# Patient Record
(demographics unavailable — no encounter records)

---

## 2024-10-14 NOTE — PHYSICAL EXAM
[de-identified] : Foot/ankle (bilateral)   Inspection  Skin: normal  Swelling: Mild over the deltoid ligaments bilaterally Arch: Moderate pes planus Tendon defect: none   Palpation  Tenderness: Mild Location: Over the deltoid ligaments bilaterally  Ankle ROM  Dorsiflexion: normal  Plantarflexion: normal  Eversion: normal  Inversion: normal  Toe flexion: normal  Toe extension: normal  Foot Motor Strength  Ankle plantarflexion: 5/5  Dorsiflexion: 5/5  Inversion: 4/5  Eversion: 5/5  First digit flexion: 1-2/5 bilaterally  Sensory index  Normal Distal pulses Normal   Stress test  Negative: ankle anterior drawer, ankle lateral tilt, subtalar inversion, subtalar eversion   Special Tests  Bernal test: normal  Kleigers test: normal  Tinnels: normal

## 2024-10-14 NOTE — DISCUSSION/SUMMARY
[de-identified] : CAMRON DUNHAM is a 70 year old female with PMH of rheumatoid arthritis and crystal arthropathy presents today for acute on chronic bilateral medial sided ankle and foot swelling consistent with pes planus causing increased stress on the bilateral feet and ankles and likely chronic deltoid ligament sprains.  Discussed paresthesias to the bilateral great toes are likely due to some compressive mild neuropathy of the interdigital nerves.  Patient states overall her ankle pain and swelling has improved however her main concern at this time is inability to flex the great toe bilaterally.  Denies any trauma or other issues that she can recall.  Plan: 1.  Bilateral lower extremity EMGs ordered to rule out any pathology 2.  Also discussed referral to formal PT can be helpful and can adjust accordingly based on EMG results 3.  Continue Superfeet inserts as needed 4.  Continue home exercise program 5.  Follow-up in 3 weeks after EMG

## 2024-10-14 NOTE — HISTORY OF PRESENT ILLNESS
[de-identified] : CAMRON DUNHAM is a 70 year old female with PMH of rheumatoid arthritis and crystal arthropathy presents today for acute on chronic bilateral medial sided ankle and foot swelling.  She says this has been going on more significant recently over the last 3 weeks and has been a cause for concern for her.  The swelling and discomfort is mostly on the medial aspect of both ankles equally.  She also is feeling some paresthesias and tingling into the bilateral first toes.  Here today for further evaluation.  Interval history: Overall improved in terms of ankle pain and swelling however with inability to flex the bilateral first digits in her toes.  States this has been going on for months and is now her main concern.  Denies trauma or other inciting event.

## 2024-10-17 NOTE — PHYSICAL EXAM
[Chaperone Present] : A chaperone was present in the examining room during all aspects of the physical examination [[id number]] : A chaperone was present during the pelvic exam. [FreeTextEntry2] : clarice self [Appropriately responsive] : appropriately responsive [Alert] : alert [No Acute Distress] : no acute distress [No Lymphadenopathy] : no lymphadenopathy [Regular Rate Rhythm] : regular rate rhythm [No Murmurs] : no murmurs [Clear to Auscultation B/L] : clear to auscultation bilaterally [Soft] : soft [Non-tender] : non-tender [Non-distended] : non-distended [No HSM] : No HSM [No Lesions] : no lesions [No Mass] : no mass [Oriented x3] : oriented x3 [Examination Of The Breasts] : a normal appearance [No Masses] : no breast masses were palpable [Single ___ cm] : a single [unfilled] ~Ucm [Location: ___] : [unfilled] [Labia Majora] : normal [Labia Minora] : normal [Normal] : normal [Uterine Adnexae] : normal

## 2024-10-17 NOTE — PHYSICAL EXAM
[FreeTextEntry3] :  Gen:                        Well appearing, well nourished, NAD. Skin:                       Eccrine:                 Within normal limits   Face:                      Neck:                            Genital:                                  Neuro:                     No focal deficits. Age appropriate. Psych:                     Appropriate affect.

## 2024-10-17 NOTE — ASSESSMENT
[FreeTextEntry1] : Pt with RA on HCQ, enbrel  #rosacea -gentle care -start azelaic acid 1-2x/d as alistair -sun protection stressed  #SKs -DPNs on face, pt concerned about getting more -benign, reassurance, cosmetic elective referral declined  #Lichensclerosis  -clinically consistent findings on labia, biopsy done today by GYN -avoid pick scratching rubbing area (pt currently doing all of above) -agree would start halobetasol cr and f/up path results

## 2024-10-17 NOTE — PROCEDURE
[Cervical Pap Smear] : cervical Pap smear [Liquid Base] : liquid base [Tolerated Well] : the patient tolerated the procedure well [No Complications] : there were no complications [Vulvar Biopsy] : Vulvar Biopsy [] : in the Roxane-Clitoral region

## 2024-10-17 NOTE — HISTORY OF PRESENT ILLNESS
[FreeTextEntry1] : rash, lichen sclerosus [de-identified] : Pt referred by PCP Dr. Hoskins for rash on face also here for eval of genital LS biopsy just done by GYN today, was rx'd halobetasol cream

## 2024-10-22 NOTE — ASSESSMENT
[FreeTextEntry1] : Patient with +CCP RA on HCQ tx hand arthropathy :  As patient staves off of biologic tx, would titrate HCQ to twice daily with short interval of NSAID use.   Patient will benefit from physical therapy to help with joint mobility and muscle strengthening. Quadriceps strengthening exercises demonstrated in the office. Weight loss has been encouraged to reduce load over the medial joint line. Viscosupplementation has been encouraged to provide additional lubrication and joint support. Food plan discussed in detail.  Avoidance of fried foods rec in the setting of hepatic steatosis.  Avoidance of high purine foods and etoh to help with artralgias including crystal arthropathy , commonly seen in post menopausal state.   She will continue on Calcium and VitD at the recommended doses of 1200mg and 800IU daily, respectively, whether through foods or supplements.  We reviewed calcium and VitD enriched foods as well.   She is in agreement with the above plan and will return in three months' time.

## 2024-10-22 NOTE — REVIEW OF SYSTEMS
[Fever] : no fever [Chills] : no chills [Eye Pain] : no eye pain [Sore Throat] : no sore throat [Hoarseness] : no hoarseness [Joint Swelling] : joint swelling [Joint Stiffness] : joint stiffness [Difficulty Walking] : no difficulty walking [Easy Bleeding] : no tendency for easy bleeding [Easy Bruising] : no tendency for easy bruising

## 2024-10-22 NOTE — HISTORY OF PRESENT ILLNESS
[FreeTextEntry1] : Patient returns with resurfacing of hand arthropathy and RT foot arthropathy after re introduction of red wine and fried foods.  She reports intermittent stiffness of the hands with recent discomfort of the 2nd IP joints over few days period accompanied with swelling.   She has resumed Etanercept and c/w HCQ tx.  Prior hand films reflect degenerative changes without erosive sequelae. Blood testing reflecting positive CCP rheumatoid arthritis ; Hep C viral loads are undetected. She c/w caring full time for mother in [place].  She otherwise denies visual disturbances, oral ulcers, shortness of breath, chest pain, motor/sensory disturbances, Raynauds or fever.

## 2024-10-22 NOTE — CONSULT LETTER
[Dear  ___] : Dear  [unfilled], [Courtesy Letter:] : I had the pleasure of seeing your patient, [unfilled], in my office today. [Please see my note below.] : Please see my note below. [Consult Closing:] : Thank you very much for allowing me to participate in the care of this patient.  If you have any questions, please do not hesitate to contact me. [Sincerely,] : Sincerely, [FreeTextEntry2] : Nadia Hernandez MD [FreeTextEntry3] : Breann Shankar M.D., RhMSUS\par   of Medicine \par  [facility]\par  \par

## 2024-10-22 NOTE — PHYSICAL EXAM
[General Appearance - Alert] : alert [General Appearance - In No Acute Distress] : in no acute distress [Sclera] : the sclera and conjunctiva were normal [Auscultation Breath Sounds / Voice Sounds] : lungs were clear to auscultation bilaterally [Heart Sounds] : normal S1 and S2 [Edema] : there was no peripheral edema [No Spinal Tenderness] : no spinal tenderness [] : no rash [Skin Lesions] : no skin lesions [Motor Exam] : the motor exam was normal [Oriented To Time, Place, And Person] : oriented to person, place, and time [Impaired Insight] : insight and judgment were intact [FreeTextEntry1] : Mild synovitis in the IPs b/l ; maintained tibiotalar motions

## 2024-10-31 NOTE — HISTORY OF PRESENT ILLNESS
[FreeTextEntry1] : 70 year old woman with numbness in big toes, decreased ROM, ankle swelling, chronic lower back pain. No previous EMG testing. Plans to start PT.

## 2024-10-31 NOTE — PHYSICAL EXAM
[Normal] : Alert and in no acute distress [de-identified] : pes planus, DF 5/5 [de-identified] : sensation intact

## 2024-10-31 NOTE — ASSESSMENT
[FreeTextEntry1] : 70 year old woman h/o RA with decreased ROM b/l great toes, numbness in feet EMG/NCV with no evidence for acute radiculopathy or sensory neuropathy

## 2024-11-04 NOTE — PHYSICAL EXAM
[de-identified] : Foot/ankle (bilateral)   Inspection  Skin: normal  Swelling: None Arch: Moderate pes planus Tendon defect: none   Palpation  Tenderness: None Location: Over the deltoid ligaments bilaterally  Ankle ROM  Dorsiflexion: normal  Plantarflexion: normal  Eversion: normal  Inversion: normal  Toe flexion: normal  Toe extension: normal  Foot Motor Strength  Ankle plantarflexion: 5/5  Dorsiflexion: 5/5  Inversion: 4/5  Eversion: 5/5  First digit flexion: 2/5 bilaterally  Sensory index  Normal Distal pulses Normal   Stress test  Negative: ankle anterior drawer, ankle lateral tilt, subtalar inversion, subtalar eversion   Special Tests  Bernal test: normal  Kleigers test: normal  Tinnels: normal

## 2024-11-04 NOTE — HISTORY OF PRESENT ILLNESS
[de-identified] : CAMRON DUNHAM is a 70 year old female with PMH of rheumatoid arthritis and crystal arthropathy presents today for acute on chronic bilateral medial sided ankle and foot swelling.  She says this has been going on more significant recently over the last 3 weeks and has been a cause for concern for her.  The swelling and discomfort is mostly on the medial aspect of both ankles equally.  She also is feeling some paresthesias and tingling into the bilateral first toes.  Here today for further evaluation.  Interval history: Pain and swelling have not returned.  Has been doing home exercises for the big toe.  Had EMG done here to discuss results.  Will start physical therapy next week.

## 2024-11-04 NOTE — DISCUSSION/SUMMARY
[de-identified] : CAMRON DUNHAM is a 70 year old female with PMH of rheumatoid arthritis and crystal arthropathy presents today for acute on chronic bilateral medial sided ankle and foot swelling consistent with pes planus causing increased stress on the bilateral feet and ankles and likely chronic deltoid ligament sprains.  Discussed paresthesias to the bilateral great toes are likely due to some compressive mild neuropathy of the interdigital nerves.  Discussed results of EMG which did not show any signs of radiculopathy sensory changes or focal pathologies.  Discussed toe range of motion and strength should improve with physical therapy.  Plan: 1.  Patient will start physical therapy next week 2.  Continue home exercise program 3.  Continue Superfeet inserts as needed 4.  Patient will follow-up in 3 months as needed

## 2024-12-09 NOTE — OB HISTORY
[Total Preg ___] : : [unfilled] [Full Term ___] : [unfilled] (full-term) [Abortions ___] : [unfilled] (abortions) [Living ___] : [unfilled] (living) [Menarche Age ____] : age at menarche was [unfilled] [Menopause  Age ____] : menopause occurred at age [unfilled]

## 2024-12-11 NOTE — ASSESSMENT
[FreeTextEntry1] : dyspepsia  discussed Rule of 2's; pt should avoid eating too much; too fast; too spicy; too lousy; less than two hours before bed  -Things to avoid including overeating, spicy foods, tight clothing, eating within three hours of bed, this list is not all inclusive.  -For treatment of reflux, possible options discussed including diet control, H2 blockers, PPIs, as well as coating motility agents discussed as treatment options. Timing of meals and proximity of last meal to sleep were discussed. If symptoms persist, a formal gastrointestinal evaluation is needed.  we discussed elevating bed to 45 degree angle   avoid spicy foods nsaids   dont eat and lie down wait 2hrs   2 kyra ring    no dysphagia   treated in 2022  if reflux worsens and continues   will do egd  .   3 fatty liver  Reviewed the spectrum of disease, the risk of disease progression to developing cirrhosis and the associated complications. Explained the patient may develop liver cancer without cirrhosis and therefore should be under the yearly surveillance with an abdominal ultrasound. Taught back that the best treatment of fatty liver disease is diet and exercise. Discussed the present diet with the patient and recommended the avoidance of fatty foods and to follow a heart healthy diet. Also explained that weight loss may lead to an improvement in the overall underlying liver disease. Taught back the physiology of alcohol abstinence has a important contribution to liver health.  4. arthritis  Patient will benefit from physical therapy to help with joint mobility and muscle strengthening. Quadriceps strengthening exercises encouraged. Weight loss has been encouraged to reduce load over the joint line.  Discussed comprehensive approach involving diet and nutrition, regular physical activity, and behavior change , with an emphasis on long-term weight management rather than short term extreme weight reduction. Discussions on relaxation, stress-reducing techniques and importance of good sleep hygiene reviewed.

## 2024-12-11 NOTE — HISTORY OF PRESENT ILLNESS
[FreeTextEntry1] : rosacea, mild PFB, LS [de-identified] : rosacea and mild PFB on face vulvar lichen sclerosus and vulvar dysplasia on halobetasol, seeing Dr. Juarez for vulvectomy

## 2024-12-11 NOTE — PHYSICAL EXAM
[Alert] : alert [No Acute Distress] : no acute distress [Sclera] : the sclera and conjunctiva were normal [Normal Lips/Gums] : the lips and gums were normal [Oropharynx] : the oropharynx was normal [Normal Appearance] : the appearance of the neck was normal [Heart Rate And Rhythm] : heart rate was normal and rhythm regular [Normal S1, S2] : normal S1 and S2 [Murmurs] : no murmurs [None] : no edema [Soft, Nontender] : the abdomen was soft and nontender [No Mass] : no masses were palpated [No HSM] : no hepatosplenomegaly noted [Cervical Lymph Nodes Enlarged Posterior Bilaterally] : no posterior cervical lymphadenopathy [Cervical Lymph Nodes Enlarged Anterior Bilaterally] : no anterior cervical lymphadenopathy [No CVA Tenderness] : no CVA  tenderness [Abnormal Walk] : normal gait [No Clubbing, Cyanosis] : no clubbing or cyanosis of the fingernails [Normal Color / Pigmentation] : normal skin color and pigmentation [] : no rash [Normal Turgor] : normal skin turgor [Motor Exam] : the motor exam was normal [Normal] : oriented to person, place, and time

## 2024-12-11 NOTE — HISTORY OF PRESENT ILLNESS
[de-identified] : 0/14/22 Schatzki ring broken up and chronic inactive gastritis.   [FreeTextEntry1] : : 10/14/22 hyperplastic changes in sigmoid. [de-identified] : XAM: [id number] - US ABDOMEN COMPLETE  - ORDERED BY: WING DOLL   PROCEDURE DATE:  09/09/2024    INTERPRETATION:  CLINICAL INFORMATION: 70-year-old female with fatty liver, hepatitis C  COMPARISON: Abdominal pelvic CT 12/15/2017  TECHNIQUE: Sonography of the abdomen.  FINDINGS: Liver: The liver parenchyma demonstrates mildly increased echogenicity suggesting possible mild hepatic steatosis. The liver surface contour is smooth. No focal intrahepatic abnormality is identified. Bile ducts: Normal caliber. Common bile duct measures 3 mm. Gallbladder: Within normal limits. No gallstones, wall thickening, or pericholecystic fluid. Pancreas: Visualized portions are within normal limits. Spleen: 8.2 cm. Within normal limits. Right kidney: 10.8 cm. No hydronephrosis. Left kidney: 10.9 cm. No hydronephrosis. Ascites: None. Aorta and IVC: Visualized portions are within normal limits.  IMPRESSION:  1. The liver parenchyma demonstrates mildly increased echogenicity suggesting possible mild hepatic steatosis. The liver is otherwise unremarkable. No evidence for cirrhosis or focal liver lesion. 2. Otherwise unremarkable abdominal ultrasound.

## 2024-12-11 NOTE — ASSESSMENT
[FreeTextEntry1] : Pt with RA on HCQ, enbrel rosacea and mild PFB on face azelaic denied by insurance vulvar lichen sclerosus and vulvar dysplasia on halobetasol, seeing Dr. Juarez for vulvectomy  #rosacea, mild PFB -stop plucking, switch to trimming or LHR -gentle care, sun protection -azelaic not covered - rec START OTC 10% azelaic from TO. if failure then OTC differin - both discussed. Proper use and SE meds disc.  -START HC lotion daily up to 1 week, then taper to TIW prn maint  #Lichensclerosis  -clinically consistent findings on labia, biopsy done by GYN showed dysplasia in background lichenoid inflammation -avoid pick scratching rubbing area -halobetasol, seeing Dr. Juarez for vulvectomy

## 2024-12-18 NOTE — RESULTS/DATA
[] : results reviewed [ECG Reviewed] : reviewed [Normal] : The 12 - lead ECG is normal [NSR] : normal sinus rhythm [de-identified] : elevated PTT

## 2024-12-18 NOTE — RESULTS/DATA
[] : results reviewed [ECG Reviewed] : reviewed [Normal] : The 12 - lead ECG is normal [NSR] : normal sinus rhythm [de-identified] : elevated PTT

## 2024-12-20 NOTE — PHYSICAL EXAM
[Chaperone Present] : A chaperone was present in the examining room during all aspects of the physical examination [74370] : A chaperone was present during the pelvic exam. [Normal] : Recto-Vaginal Exam: Normal [Fully active, able to carry on all pre-disease performance without restriction] : Status 0 - Fully active, able to carry on all pre-disease performance without restriction [Abnormal] : External genitalia: Abnormal [de-identified] : bilateral  anterior vulva involved with dvin [de-identified] : smooth rectovag septum, no cul de sac nodules.
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
## 2024-12-20 NOTE — PHYSICAL EXAM
[Chaperone Present] : A chaperone was present in the examining room during all aspects of the physical examination [[id number]] : A chaperone was present during the pelvic exam. [Normal] : Recto-Vaginal Exam: Normal [Fully active, able to carry on all pre-disease performance without restriction] : Status 0 - Fully active, able to carry on all pre-disease performance without restriction [Abnormal] : External genitalia: Abnormal [de-identified] : bilateral  anterior vulva involved with dvin [de-identified] : smooth rectovag septum, no cul de sac nodules.

## 2024-12-20 NOTE — PLAN
[FreeTextEntry1] :  During today's visit, I spent 30 minutes reviewing the patient's medical chart, addressing the patient's concerns, and discussing their treatment plan in detail. We reviewed the results of recent tests and discussed the next steps in their care, including medication adjustments and follow-up appointments. Additionally, I took the time to answer the patient's questions and provide education on managing their condition at home.  This extended consultation allowed for a comprehensive assessment and personalized approach to the patient, reflecting the complexity and time required for today's visit.

## 2024-12-20 NOTE — HISTORY OF PRESENT ILLNESS
[FreeTextEntry1] : GYN/Ref:  Dr. Bernal PCP:  Dr. Alexa Perez GI:  Dr. Stacie Hoskins  Ms. Turpin, 70 years old, referred for dVIN s/p roxane-clitoral / vulvar biopsy.  Pt has history of lichen sclerosis; left breast cancer in 2017 with reconstruction and chemotherapy; hepatitis C, under control, rheumatoid arthritis.    Pt states no routine GYN care for a few years.  However, she was prescribed a "cream" for vaginal itching shortly after completing chemotherapy treatment for breast cancer in 2017.  Most recently, symptoms have progressed to burning when urinating and increased vulvar redness.  She saw Dr. Bernal who then did a biopsy identifying vulvar dysplasia.    Denies f/c/n/v/d/vaginal bleeding, Denies abdominal pain, pressure, or bloating.  Denies any other associated symptoms.  She is going to physical therapy for bilateral foot pain (workup included gout assessment which she says she does not have).  She also has low back pain which radiates to her right upper buttock and occasionally right flank.    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 See note: Note:  Immunohistochemical stains results; P16 is negative, P53 is wild-type, and Ki67 confined to basal cell layers  POB:   (2 abortions; 1 vaginal delivery - child is now 35 years old) PGYN:  Menarche age 13,  LMP age 45 PMH:  hepatitis C, Rheumatoid arthritis, HTN Meds:  hydroxychloroquine; ramipril Allergies: NKDA PSH:  Left breast mastectomy with Kristen flap (2017 - Dr. Joseluis Thompson;  Dr. Rushing); right hip replacement  Social Hx:   Lives alone, ex smoker (quit  - smoked for 40 years); social alcohol; no drugs FH:  No family history of cancer  Health Maintenance: Mammogram: 24:  BIRADS 2 ([facility]) Colonoscopy:   with Dr. Hoskins - next due  DEXA:  PAP: :  Negative for intraepithelial lesion or malignancy.  Negative HPV ([facility])

## 2024-12-20 NOTE — HISTORY OF PRESENT ILLNESS
[No Pertinent Cardiac History] : no history of aortic stenosis, atrial fibrillation, coronary artery disease, recent myocardial infarction, or implantable device/pacemaker [No Pertinent Pulmonary History] : no history of asthma, COPD, sleep apnea, or smoking [No Adverse Anesthesia Reaction] : no adverse anesthesia reaction in self or family member [(Patient denies any chest pain, claudication, dyspnea on exertion, orthopnea, palpitations or syncope)] : Patient denies any chest pain, claudication, dyspnea on exertion, orthopnea, palpitations or syncope [FreeTextEntry1] : vulvectomy [FreeTextEntry4] : 70yoF PMH RA, left breast CA s/p left mastectomy with muscle flap reconstruction s/p adjuvant chemotherapy (2017), OA s/p right R THR, Schatzki ring, vulvar dysplasia presents for preop assessment  enbrel held x 1 month UTD ophth exam  [de-identified] : 69yoF PMH RA, left breast CA s/p left mastectomy with muscle flap reconstruction s/p adjuvant chemotherapy (2017), OA s/p right R THR, Schatzki ring presents to establish care  follows closely with rheum, stable ophth exam q 6 mos - normal exam  follows with outside oncologist Dr Erwin, next appt 1/2024  endorses perioral papular rash wears mask constantly dermatology - perioral dermatitis  endorses subcutaneous nodule palmar aspect hand, nontender

## 2024-12-20 NOTE — DISCUSSION/SUMMARY
[Reviewed Clinical Lab Test(s)] : Results of clinical tests were reviewed. 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 Patient understands that during resection, portion of clitoris may need resection and this may affect her sexual funciton. patient would like to proceed with surgery discussed plan for simple vulvectomy b/l vulvectomy,  discussed risks including bleeding, infection, injury to surrounding structures, vessels, nerves, lymphedema, wound separation  plan for med clearance h/o hep C PST all quesitons answered

## 2024-12-20 NOTE — ASSESSMENT
[High Risk Surgery - Intraperitoneal, Intrathoracic or Supringuinal Vascular Procedures] : High Risk Surgery - Intraperitoneal, Intrathoracic or Supringuinal Vascular Procedures - No (0) [Ischemic Heart Disease] : Ischemic Heart Disease - No (0) [Congestive Heart Failure] : Congestive Heart Failure - No (0) [Prior Cerebrovascular Accident or TIA] : Prior Cerebrovascular Accident or TIA - No (0) [Creatinine >= 2mg/dL (1 Point)] : Creatinine >= 2mg/dL - No (0) [Insulin-dependent Diabetic (1 Point)] : Insulin-dependent Diabetic - No (0) [0] : 0 , RCRI Class: I, Risk of Post-Op Cardiac Complications: 3.9%, 95% CI for Risk Estimate: 2.8% - 5.4% [Patient Requires Further Testing] : Patient requires further testing [Modify medications prior to procedure] : Modify medications prior to procedure [As per surgery] : as per surgery [Patient Optimized for Surgery] : Patient optimized for surgery [No Further Testing Recommended] : no further testing recommended [FreeTextEntry7] : enbrel held

## 2024-12-20 NOTE — PHYSICAL EXAM
[Chaperone Present] : A chaperone was present in the examining room during all aspects of the physical examination [23083] : A chaperone was present during the pelvic exam. [Normal] : Recto-Vaginal Exam: Normal [Fully active, able to carry on all pre-disease performance without restriction] : Status 0 - Fully active, able to carry on all pre-disease performance without restriction [Abnormal] : External genitalia: Abnormal [de-identified] : bilateral  anterior vulva involved with dvin [de-identified] : smooth rectovag septum, no cul de sac nodules.

## 2024-12-20 NOTE — HISTORY OF PRESENT ILLNESS
[No Pertinent Cardiac History] : no history of aortic stenosis, atrial fibrillation, coronary artery disease, recent myocardial infarction, or implantable device/pacemaker [No Pertinent Pulmonary History] : no history of asthma, COPD, sleep apnea, or smoking [No Adverse Anesthesia Reaction] : no adverse anesthesia reaction in self or family member [(Patient denies any chest pain, claudication, dyspnea on exertion, orthopnea, palpitations or syncope)] : Patient denies any chest pain, claudication, dyspnea on exertion, orthopnea, palpitations or syncope [FreeTextEntry1] : vulvectomy [FreeTextEntry4] : 70yoF PMH RA, left breast CA s/p left mastectomy with muscle flap reconstruction s/p adjuvant chemotherapy (2017), OA s/p right R THR, Schatzki ring, vulvar dysplasia presents for preop assessment  enbrel held x 1 month UTD ophth exam  [de-identified] : 69yoF PMH RA, left breast CA s/p left mastectomy with muscle flap reconstruction s/p adjuvant chemotherapy (2017), OA s/p right R THR, Schatzki ring presents to establish care  follows closely with rheum, stable ophth exam q 6 mos - normal exam  follows with outside oncologist Dr Erwin, next appt 1/2024  endorses perioral papular rash wears mask constantly dermatology - perioral dermatitis  endorses subcutaneous nodule palmar aspect hand, nontender

## 2025-01-16 NOTE — LETTER BODY
[Dear  ___] : Dear  [unfilled], [I recently saw our patient [unfilled] for a follow-up visit.] : I recently saw our patient, [unfilled] for a follow-up visit. [Attached please find my note.] : Attached please find my note. [FreeTextEntry2] :   Pt is s/p Partial simple bilateral vulvectomy and vulvar biopsies on 1/3/25   [FreeTextEntry1] : final path [FreeTextEntry1] :  - Continue with all current treatments. \par  - Labs done in office\par - Start on statin therapy in view of elevated 10 year ASCVD risk score and patient is in agreement with this.

## 2025-01-16 NOTE — REASON FOR VISIT
[Post Op] : post op visit [de-identified] : 1/3/2025 [de-identified] : Partial simple bilateral vulvectomy and vulvar biopsies.   [de-identified] : Denies f/c/n/v/d/vaginal bleeding.  Overall feels well.  Meeting milestones of recovery.  Regular BM and voiding freely.  Final pathology: Pending

## 2025-01-16 NOTE — DISCUSSION/SUMMARY
[Clean] : was clean [Dry] : was dry [Intact] : was intact [Erythema] : was not erythematous [Ecchymosis] : was not ecchymotic [None] : had no drainage

## 2025-01-21 NOTE — ASSESSMENT
[FreeTextEntry1] : Patient with +CCP RA on HCQ tx hand and LT knee arthropathy :  As patient staves off of biologic tx, would c/w titrated dose of HCQ twice daily. She understands the increased risk of cardiovascular events related to RA and therefore the importance of dietary and lifestyle modifications. Patient will benefit from physical therapy to help with joint mobility and muscle strengthening. Quadriceps strengthening exercises demonstrated in the office. US guided LT knee injection given for pain relief.  Weight loss has been encouraged to reduce load over the medial joint line. Viscosupplementation has been encouraged to provide additional lubrication and joint support.  Food plan discussed in detail.  Avoidance of fried foods rec in the setting of hepatic steatosis.  Avoidance of high purine foods and etoh to help with artralgias including crystal arthropathy , commonly seen in post menopausal state.   She will continue on Calcium and VitD at the recommended doses of 1200mg and 800IU daily, respectively, whether through foods or supplements.  We reviewed calcium and VitD enriched foods as well.  She is in agreement with the above plan and will return in three months' time.

## 2025-01-21 NOTE — PHYSICAL EXAM
[General Appearance - Alert] : alert [General Appearance - In No Acute Distress] : in no acute distress [Sclera] : the sclera and conjunctiva were normal [Auscultation Breath Sounds / Voice Sounds] : lungs were clear to auscultation bilaterally [Heart Sounds] : normal S1 and S2 [Edema] : there was no peripheral edema [No Spinal Tenderness] : no spinal tenderness [] : no rash [Skin Lesions] : no skin lesions [Motor Exam] : the motor exam was normal [Oriented To Time, Place, And Person] : oriented to person, place, and time [Impaired Insight] : insight and judgment were intact [FreeTextEntry1] : Mild synovitis in the IPs b/l ; tender over the ljoint lines of the LT knee ;maintained tibiotalar motions

## 2025-01-21 NOTE — PROCEDURE
[Today's Date:] : Date: [unfilled] [Patient] : the patient [Risks] : risks [Benefits] : benefits [Consent Obtained] : written consent was obtained prior to the procedure and is detailed in the patient's record [Therapeutic] : therapeutic [#1 Site: ______] : #1 site identified in the [unfilled] [Betadine] : betadine solution [Alcohol] : alcohol [___ml 1% Lidocaine] : [unfilled] ml of 1% lidocaine [Depomedrol ___ mg] : Depomedrol [unfilled] mg [Tolerated Well] : the patient tolerated the procedure well [No Complications] : there were no complications [Patient Instructed to Call] : patient was instructed to call if redness at site, a decrease in range of motion or an increase in pain is noted after procedure. [de-identified] : 22g x  2 in inserted  [FreeTextEntry1] : Patient Name: Carlotta Turpin : 1954 Study Date: 2025 Study Type: Guidance of needle placement  Indication: Pain in LT knee joint ; obesity Study Site: LT knee Equipment: eyetok e 4-12MHz linear transducer    Findings: The use of a Logiq e 12 MHz linear transducer with live ultrasound guidance of the knee was necessary given the patient's BMI and local body habitus overlying and obscuring the accurate identification of normal body bony anatomy used to identify the injection site and the depth of soft tissue space;the location of the needle tip and intra-articular delivery of the medication while avoiding neurovascular structures confirmed. Orthogonal views of the suprapatellar synovial pouch was taken with US .  After prepping the lateral knee with betadine, a 22 x2.0guage needle was advanced to the synovial cavity under direct US visualization using in-plane technique.  40mg of methylprednisolone and 1% lidocaine was injected.  Impressions: Successful injection of the LT knee using US guidance.

## 2025-01-21 NOTE — HISTORY OF PRESENT ILLNESS
[FreeTextEntry1] : Patient returns with mild resurfacing of hand arthropathy and increased stiffness of the LT knee after weight gain of 10lbs since last summer.   She reports intermittent stiffness of the hands with discomfort of the RT 2nd and 3rd IP joints. She has resumed Etanercept and c/w HCQ tx.  Prior hand films reflect degenerative changes without erosive sequelae. Blood testing reflecting positive CCP rheumatoid arthritis ; Hep C viral loads are undetected. She c/w caring full time for mother in [place]. Patient awaits results of recent vulvar biopsies. She otherwise denies visual disturbances, oral ulcers, shortness of breath, chest pain, motor/sensory disturbances, Raynauds or fever.

## 2025-01-21 NOTE — CONSULT LETTER
[Dear  ___] : Dear  [unfilled], [Courtesy Letter:] : I had the pleasure of seeing your patient, [unfilled], in my office today. [Please see my note below.] : Please see my note below. [Consult Closing:] : Thank you very much for allowing me to participate in the care of this patient.  If you have any questions, please do not hesitate to contact me. [Sincerely,] : Sincerely, [FreeTextEntry2] : Alexa Perez MD  [FreeTextEntry3] : Breann Shankar M.D., RhMSUS\par   of Medicine \par  [facility] [employer] at [facility]\par  \par

## 2025-02-08 NOTE — DISCUSSION/SUMMARY
[Clean] : was clean [Dry] : was dry [Intact] : was intact [None] : had no drainage [Doing Well] : is doing well [de-identified] : well healing vulvectomy incision site.  No s/s of infection or bleeding.   [Erythema] : was not erythematous [Ecchymosis] : was not ecchymotic [FreeTextEntry1] : 1 cm area left vulva - opened.  No s/s of infection.

## 2025-02-08 NOTE — REASON FOR VISIT
[Post Op] : post op visit [de-identified] : 1/3/2025 [de-identified] : Partial simple bilateral vulvectomy and vulvar biopsies.   [de-identified] : Denies f/c/n/v/d/vaginal bleeding.  Pt still had itching and called the office stating Vaseline had helped.  Advised to switch to Aquaphor and return to the office.    Final pathology: Still Pending

## 2025-02-08 NOTE — LETTER BODY
[Dear  ___] : Dear  [unfilled], [I recently saw our patient [unfilled] for a follow-up visit.] : I recently saw our patient, [unfilled] for a follow-up visit. [Attached please find my note.] : Attached please find my note. [FreeTextEntry2] :   Pt is s/p Partial simple bilateral vulvectomy and vulvar biopsies on 1/3/25   [FreeTextEntry1] : final path

## 2025-02-08 NOTE — ASSESSMENT
[FreeTextEntry1] : -  small area 1 cm left upper vulva opened.  No s/s infection.  No bleeding.   -  Advised to keep this opened area clean, dry.   -  May use clobetasol for itching of surrounding areas.   -  No s/s infection -  increase protein for wound healing.  -  maintain pelvic rest activities for another 3 weeks.   -  Dr. Juarez will call with final path when reported.   -  Plan for 6 mos follow up with Dr. Juarez.  -  Patient verbalized understanding of plan and agrees. -  All questions answered.

## 2025-02-08 NOTE — DISCUSSION/SUMMARY
[Clean] : was clean [Dry] : was dry [Intact] : was intact [None] : had no drainage [Doing Well] : is doing well [de-identified] : well healing vulvectomy incision site.  No s/s of infection or bleeding.   [Erythema] : was not erythematous [Ecchymosis] : was not ecchymotic [FreeTextEntry1] : 1 cm area left vulva - opened.  No s/s of infection.

## 2025-02-08 NOTE — REASON FOR VISIT
[Post Op] : post op visit [de-identified] : 1/3/2025 [de-identified] : Partial simple bilateral vulvectomy and vulvar biopsies.   [de-identified] : Denies f/c/n/v/d/vaginal bleeding.  Pt still had itching and called the office stating Vaseline had helped.  Advised to switch to Aquaphor and return to the office.    Final pathology: Still Pending

## 2025-02-15 NOTE — REASON FOR VISIT
[Post Op] : post op visit [de-identified] : 1/3/2025 [de-identified] : Partial simple bilateral vulvectomy and vulvar biopsies.   [de-identified] : Denies f/c/n/v/d/vaginal bleeding.  Returns today.  Improved symptoms.     Dr. Juarez called patient yesterday to discuss findings. Final path no dysplasia.  Dr. Juarez prescribed tacrolimus for prn itching.    Final pathology: 1. Bilateral anterior vulvectomy - Lichenoid dermatitis. - See comment.  2. Left lateral vulva, biopsy - Skin with chronic inflammation. - See comment.  3. Right lateral vulva, biopsy - Skin with chronic inflammation. - See comment.  4. Left posterior vulva, biopsy - Lichenoid dermatitis. - See comment.  5. Right posterior vulva, biopsy - Lichenoid dermatitis. - See comment.  6. Perineum, biopsy - Skin with chronic inflammation. - See comment.  Comment: Multiple levels were performed on specimen #2A to #6A.  Immunohistochemical stains for p16 performed on specimen #1B, #1F, #1G, #1H, #2A, #3A, #4A and #6A reveal patchy expression. Immunohistochemical stain for p16 performed on specimen #5A reveals focal block-like staining. Immunohistochemical stains for p53 performed on specimen #1F, #3A, #4A, #5A and #6A reveal a wild-type expression.  HPV E6/E7 CHELITA performed at the [facility] on specimen #5A is negative.

## 2025-02-15 NOTE — DISCUSSION/SUMMARY
[Clean] : was clean [Dry] : was dry [None] : had no drainage [Doing Well] : is doing well [Erythema] : was not erythematous [Ecchymosis] : was not ecchymotic [Excellent Pain Control] : has excellent pain control [No Sign of Infection] : is showing no signs of infection [FreeTextEntry1] : Well healed vulvectomy site.  No s/s infection; no bleedng.

## 2025-02-15 NOTE — ASSESSMENT
[FreeTextEntry1] : - Well healed vulvectomy site.  No s/s of infection -  Final path discussed with patient by Dr. Juarez yesterday. Final path benign -  Plan for tacrolimus for prn itching.  -  Plan for 6 mos follow up with Dr. Juarez.  -  Patient verbalized understanding of plan and agrees. -  All questions answered.

## 2025-04-11 NOTE — REASON FOR VISIT
Problem: Patient Care Overview  Goal: Plan of Care/Patient Progress Review    Discharge Planner PT   Patient plan for discharge: home  Current status: Pt mobilizing CGA/SBA.  Barriers to return to prior living situation: home alone during the day  Recommendations for discharge: Home w/ assist for ADLs/IADL's from spouse as needed  Rationale for recommendations: No need for skilled PT consult, mobilizing sufficiently at time of discharge.       Entered by: Rhett Horn 11/01/2018 4:13 PM            [FreeTextEntry1] :   follow up

## 2025-04-11 NOTE — HISTORY OF PRESENT ILLNESS
[FreeTextEntry1] :   GYN/Ref: Dr. Bernal PCP: Dr. Alexa Perez GI: Dr. Stacie Hoskins  Ms. Turpin, 70 years old, s/p Partial simple bilateral vulvectomy and vulvar biopsies on 1/3/25.    Final pathology: 1/3/25 simple partial vulvectomy ([facility]) 1. Bilateral anterior vulvectomy - Lichenoid dermatitis. - See comment. 2. Left lateral vulva, biopsy - Skin with chronic inflammation. - See comment. 3. Right lateral vulva, biopsy - Skin with chronic inflammation. - See comment. 4. Left posterior vulva, biopsy - Lichenoid dermatitis. - See comment. 5. Right posterior vulva, biopsy - Lichenoid dermatitis. - See comment. 6. Perineum, biopsy - Skin with chronic inflammation. - See comment. Comment: Multiple levels were performed on specimen #2A to #6A. Immunohistochemical stains for p16 performed on specimen #1B, #1F, #1G, #1H, #2A, #3A, #4A and #6A reveal patchy expression. Immunohistochemical stain for p16 performed on specimen #5A reveals focal block-like staining. Immunohistochemical stains for p53 performed on specimen #1F, #3A, #4A, #5A and #6A reveal a wild-type expression. HPV E6/E7 CHELITA performed at the [facility] on specimen #5A is negative.  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 See note: Note: Immunohistochemical stains results; P16 is negative, P53 is wild-type, and Ki67 confined to basal cell layers  POB:  (2 abortions; 1 vaginal delivery - child is now 35 years old) PGYN: Menarche age 13, LMP age 45 PMH: hepatitis C, Rheumatoid arthritis, HTN Meds: hydroxychloroquine; ramipril Allergies: NKDA PSH: Left breast mastectomy with Kristen flap (2017 - Dr. Joseluis Thompson; Dr. Rushing); right hip replacement  Social Hx: Lives alone, ex smoker (quit  - smoked for 40 years); social alcohol; no drugs FH: No family history of cancer  Health Maintenance: Mammogram: 24: BIRADS 2 ([facility]) Colonoscopy:  with Dr. Hoskins - next due  DEXA: PAP: 1017/24: Negative for intraepithelial lesion or malignancy. Negative HPV ([facility])

## 2025-04-11 NOTE — HISTORY OF PRESENT ILLNESS
[FreeTextEntry1] :   GYN/Ref: Dr. Bernal PCP: Dr. Alexa Perez GI: Dr. Stacie Hoskins  Ms. Turpin, 70 years old, s/p Partial simple bilateral vulvectomy and vulvar biopsies on 1/3/25.    Final pathology: 1/3/25 simple partial vulvectomy ([facility]) 1. Bilateral anterior vulvectomy - Lichenoid dermatitis. - See comment. 2. Left lateral vulva, biopsy - Skin with chronic inflammation. - See comment. 3. Right lateral vulva, biopsy - Skin with chronic inflammation. - See comment. 4. Left posterior vulva, biopsy - Lichenoid dermatitis. - See comment. 5. Right posterior vulva, biopsy - Lichenoid dermatitis. - See comment. 6. Perineum, biopsy - Skin with chronic inflammation. - See comment. Comment: Multiple levels were performed on specimen #2A to #6A. Immunohistochemical stains for p16 performed on specimen #1B, #1F, #1G, #1H, #2A, #3A, #4A and #6A reveal patchy expression. Immunohistochemical stain for p16 performed on specimen #5A reveals focal block-like staining. Immunohistochemical stains for p53 performed on specimen #1F, #3A, #4A, #5A and #6A reveal a wild-type expression. HPV E6/E7 CHELITA performed at the [facility] on specimen #5A is negative.  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 See note: Note: Immunohistochemical stains results; P16 is negative, P53 is wild-type, and Ki67 confined to basal cell layers  POB:  (2 abortions; 1 vaginal delivery - child is now 35 years old) PGYN: Menarche age 13, LMP age 45 PMH: hepatitis C, Rheumatoid arthritis, HTN Meds: hydroxychloroquine; ramipril Allergies: NKDA PSH: Left breast mastectomy with Kristen flap (2017 - Dr. Joseluis Thompson; Dr. Rushing); right hip replacement  Social Hx: Lives alone, ex smoker (quit  - smoked for 40 years); social alcohol; no drugs FH: No family history of cancer  Health Maintenance: Mammogram: 24: BIRADS 2 ([facility]) Colonoscopy:  with Dr. Hoskins - next due  DEXA: PAP: 1017/24: Negative for intraepithelial lesion or malignancy. Negative HPV ([facility])

## 2025-04-11 NOTE — HISTORY OF PRESENT ILLNESS
[FreeTextEntry1] :   GYN/Ref: Dr. Bernal PCP: Dr. Alexa Perez GI: Dr. Stacie Hoskins  Ms. Turpin, 70 years old, s/p Partial simple bilateral vulvectomy and vulvar biopsies on 1/3/25.    Final pathology: 1/3/25 simple partial vulvectomy ([facility]) 1. Bilateral anterior vulvectomy - Lichenoid dermatitis. - See comment. 2. Left lateral vulva, biopsy - Skin with chronic inflammation. - See comment. 3. Right lateral vulva, biopsy - Skin with chronic inflammation. - See comment. 4. Left posterior vulva, biopsy - Lichenoid dermatitis. - See comment. 5. Right posterior vulva, biopsy - Lichenoid dermatitis. - See comment. 6. Perineum, biopsy - Skin with chronic inflammation. - See comment. Comment: Multiple levels were performed on specimen #2A to #6A. Immunohistochemical stains for p16 performed on specimen #1B, #1F, #1G, #1H, #2A, #3A, #4A and #6A reveal patchy expression. Immunohistochemical stain for p16 performed on specimen #5A reveals focal block-like staining. Immunohistochemical stains for p53 performed on specimen #1F, #3A, #4A, #5A and #6A reveal a wild-type expression. HPV E6/E7 CHELITA performed at the [facility] on specimen #5A is negative.  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 See note: Note: Immunohistochemical stains results; P16 is negative, P53 is wild-type, and Ki67 confined to basal cell layers  POB:  (2 abortions; 1 vaginal delivery - child is now 35 years old) PGYN: Menarche age 13, LMP age 45 PMH: hepatitis C, Rheumatoid arthritis, HTN Meds: hydroxychloroquine; ramipril Allergies: NKDA PSH: Left breast mastectomy with Kristen flap (2017 - Dr. Joseluis Thompson; Dr. Rushing); right hip replacement  Social Hx: Lives alone, ex smoker (quit  - smoked for 40 years); social alcohol; no drugs FH: No family history of cancer  Health Maintenance: Mammogram: 24: BIRADS 2 ([facility]) Colonoscopy:  with Dr. Hoskins - next due  DEXA: PAP: 1017/24: Negative for intraepithelial lesion or malignancy. Negative HPV ([facility])

## 2025-04-22 NOTE — PHYSICAL EXAM
[General Appearance - Alert] : alert [General Appearance - In No Acute Distress] : in no acute distress [Sclera] : the sclera and conjunctiva were normal [Auscultation Breath Sounds / Voice Sounds] : lungs were clear to auscultation bilaterally [Heart Sounds] : normal S1 and S2 [Edema] : there was no peripheral edema [No Spinal Tenderness] : no spinal tenderness [] : no rash [Skin Lesions] : no skin lesions [Motor Exam] : the motor exam was normal [Oriented To Time, Place, And Person] : oriented to person, place, and time [Impaired Insight] : insight and judgment were intact [FreeTextEntry1] : Mild synovitis in the IPs b/l ; tender over the joint ljoint lines of the LT knee ;maintained tibiotalar motions

## 2025-04-22 NOTE — ASSESSMENT
[FreeTextEntry1] : Patient with +CCP RA on HCQ tx;  LT knee arthropathy improving:  As patient staves off of biologic tx, would c/w titrated dose of HCQ twice daily. She understands the increased risk of cardiovascular events related to RA and therefore the importance of dietary and lifestyle modifications. Patient will benefit from physical therapy to help with joint mobility and muscle strengthening. Quadriceps strengthening exercises demonstrated in the office. Weight loss has been encouraged to reduce load over the medial joint line. Viscosupplementation has been encouraged to provide additional lubrication and joint support.  Food plan discussed in detail.  Avoidance of fried foods rec in the setting of hepatic steatosis.  Avoidance of high purine foods and etoh to help with artralgias including crystal arthropathy , commonly seen in post menopausal state.   She will continue on Calcium and VitD at the recommended doses of 1200mg and 800IU daily, respectively, whether through foods or supplements.  We reviewed calcium and VitD enriched foods as well.   She is in agreement with the above plan and will return in three months' time.

## 2025-04-22 NOTE — CONSULT LETTER
[Dear  ___] : Dear  [unfilled], [Courtesy Letter:] : I had the pleasure of seeing your patient, [unfilled], in my office today. [Please see my note below.] : Please see my note below. [Consult Closing:] : Thank you very much for allowing me to participate in the care of this patient.  If you have any questions, please do not hesitate to contact me. [Sincerely,] : Sincerely, [FreeTextEntry2] : Alexa Perez MD  [FreeTextEntry3] : Breann Shankar M.D., RhMSUS\par   of Medicine \par  [facility] [employer] at [facility]\par  \par

## 2025-04-22 NOTE — HISTORY OF PRESENT ILLNESS
[FreeTextEntry1] : Patient returns with resurfacing of hand arthropathy and explains increased food intake with increased glycemic index.  She notes improvement in stiffness of the LT knee after IAC given at the start of the year.    She reports intermittent stiffness of the hands with discomfort of the RT 2nd and 3rd IP joints. She has resumed Etanercept and c/w HCQ tx.  Prior hand films reflect degenerative changes without erosive sequelae. Blood testing reflecting positive CCP rheumatoid arthritis ; Hep C viral loads are undetected. She c/w caring full time for mother in [place]. Vulvar bx reflective of lichen dermatitis.   She otherwise denies visual disturbances, oral ulcers, shortness of breath, chest pain, motor/sensory disturbances, Raynauds or fever.

## 2025-04-22 NOTE — REVIEW OF SYSTEMS
[Joint Swelling] : joint swelling [Joint Stiffness] : joint stiffness [Fever] : no fever [Chills] : no chills [Eye Pain] : no eye pain [Sore Throat] : no sore throat [Hoarseness] : no hoarseness [Difficulty Walking] : no difficulty walking [Easy Bleeding] : no tendency for easy bleeding [Easy Bruising] : no tendency for easy bruising

## 2025-04-28 NOTE — HISTORY OF PRESENT ILLNESS
[FreeTextEntry1] : rosacea, PFB, eyelid dermatitis [de-identified] : rosacea, PFB, eyelid dermatitis - latter is new/flaring PFB mild flare rosacea doing ok - wax and wane

## 2025-04-28 NOTE — PHYSICAL EXAM
[FreeTextEntry3] :  Gen:                        Well appearing, well nourished, NAD. Skin:                       Eccrine:                 Within normal limits   Hair:                                  Face:                      Neck:                    Neuro:                     No focal deficits. Age appropriate. Psych:                     Appropriate affect.

## 2025-05-09 NOTE — PHYSICAL EXAM
[MA] : MA [Normal] : Parametria: Normal [Fully active, able to carry on all pre-disease performance without restriction] : Status 0 - Fully active, able to carry on all pre-disease performance without restriction [FreeTextEntry2] : Elizabeth  [de-identified] : b/l mid vulvar/labial white patches; right side palpable nodular feeling [de-identified] : discharge

## 2025-05-09 NOTE — HISTORY OF PRESENT ILLNESS
[FreeTextEntry1] : GYN/Ref: Dr. Bernal PCP: Dr. Alexa Perez GI: Dr. Stacie Hoskins  Ms. Turpin, 70 years old, s/p Partial simple bilateral vulvectomy and vulvar biopsies on 1/3/25.  Has been having postop itching; advised to use tacloimus prescribed by Dr. Juarez.  Also today complains of bumps on right vulva; pelvic pressure; dysuria.  Pt presents for follow up visit from 25.  Biopsy at that time noted dVIN. Patient continues to have itching/dryness.  Final pathology: 25:  right vulvar biopsy ([facility])      - Differentiated vulvar intraepithelial neoplasia (dVIN).  1/3/25 simple partial vulvectomy ([facility]) 1. Bilateral anterior vulvectomy - Lichenoid dermatitis. - See comment. 2. Left lateral vulva, biopsy - Skin with chronic inflammation. - See comment. 3. Right lateral vulva, biopsy - Skin with chronic inflammation. - See comment. 4. Left posterior vulva, biopsy - Lichenoid dermatitis. - See comment. 5. Right posterior vulva, biopsy - Lichenoid dermatitis. - See comment. 6. Perineum, biopsy - Skin with chronic inflammation. - See comment. Comment: Multiple levels were performed on specimen #2A to #6A. Immunohistochemical stains for p16 performed on specimen #1B, #1F, #1G, #1H, #2A, #3A, #4A and #6A reveal patchy expression. Immunohistochemical stain for p16 performed on specimen #5A reveals focal block-like staining. Immunohistochemical stains for p53 performed on specimen #1F, #3A, #4A, #5A and #6A reveal a wild-type expression. HPV E6/E7 CHELITA performed at the [facility] on specimen #5A is negative.  Pathology: 10/17/24 Roxane-Clitoral / Vulvar Biopsy ([facility]) - Differentiated vulvar intraepithelial neoplasia (dVIN) in background of lichenoid inflammation. See note: Note: Immunohistochemical stains results; P16 is negative, P53 is wild-type, and Ki67 confined to basal cell layers  POB:  (2 abortions; 1 vaginal delivery - child is now 35 years old) PGYN: Menarche age 13, LMP age 45 PMH: hepatitis C, Rheumatoid arthritis, HTN Meds: hydroxychloroquine; ramipril Allergies: NKDA PSH: Left breast mastectomy with Kristen flap (2017 - Dr. Joseluis Thompson; Dr. Rushing); right hip replacement  Social Hx: Lives alone, ex smoker (quit  - smoked for 40 years); social alcohol; no drugs FH: No family history of cancer  Health Maintenance: Mammogram: 24: BIRADS 2 ([facility]) Colonoscopy:  with Dr. Hoskins - next due  DEXA: PAP: 101: Negative for intraepithelial lesion or malignancy. Negative HPV ([facility])

## 2025-05-09 NOTE — REVIEW OF SYSTEMS
[Negative] : Musculoskeletal [Dysuria] : dysuria [FreeTextEntry4] : vaginal itching, complaining of bumps on right vulva

## 2025-05-09 NOTE — PHYSICAL EXAM
[MA] : MA [Normal] : Parametria: Normal [Fully active, able to carry on all pre-disease performance without restriction] : Status 0 - Fully active, able to carry on all pre-disease performance without restriction [FreeTextEntry2] : Elizabeth  [de-identified] : b/l mid vulvar/labial white patches; right side palpable nodular feeling [de-identified] : discharge

## 2025-05-09 NOTE — HISTORY OF PRESENT ILLNESS
[FreeTextEntry1] : GYN/Ref: Dr. Bernal PCP: Dr. Alexa Perez GI: Dr. Stacie Hoskins  Ms. Turpin, 70 years old, s/p Partial simple bilateral vulvectomy and vulvar biopsies on 1/3/25.  Has been having postop itching; advised to use tacloimus prescribed by Dr. Juarez.  Also today complains of bumps on right vulva; pelvic pressure; dysuria.  Pt presents for follow up visit from 25.  Biopsy at that time noted dVIN. Patient continues to have itching/dryness.  Final pathology: 25:  right vulvar biopsy ([facility])      - Differentiated vulvar intraepithelial neoplasia (dVIN).  1/3/25 simple partial vulvectomy ([facility]) 1. Bilateral anterior vulvectomy - Lichenoid dermatitis. - See comment. 2. Left lateral vulva, biopsy - Skin with chronic inflammation. - See comment. 3. Right lateral vulva, biopsy - Skin with chronic inflammation. - See comment. 4. Left posterior vulva, biopsy - Lichenoid dermatitis. - See comment. 5. Right posterior vulva, biopsy - Lichenoid dermatitis. - See comment. 6. Perineum, biopsy - Skin with chronic inflammation. - See comment. Comment: Multiple levels were performed on specimen #2A to #6A. Immunohistochemical stains for p16 performed on specimen #1B, #1F, #1G, #1H, #2A, #3A, #4A and #6A reveal patchy expression. Immunohistochemical stain for p16 performed on specimen #5A reveals focal block-like staining. Immunohistochemical stains for p53 performed on specimen #1F, #3A, #4A, #5A and #6A reveal a wild-type expression. HPV E6/E7 CHELITA performed at the [facility] on specimen #5A is negative.  Pathology: 10/17/24 Roxane-Clitoral / Vulvar Biopsy ([facility]) - Differentiated vulvar intraepithelial neoplasia (dVIN) in background of lichenoid inflammation. See note: Note: Immunohistochemical stains results; P16 is negative, P53 is wild-type, and Ki67 confined to basal cell layers  POB:  (2 abortions; 1 vaginal delivery - child is now 35 years old) PGYN: Menarche age 13, LMP age 45 PMH: hepatitis C, Rheumatoid arthritis, HTN Meds: hydroxychloroquine; ramipril Allergies: NKDA PSH: Left breast mastectomy with Kristen flap (2017 - Dr. Joseluis Thompson; Dr. Rushing); right hip replacement  Social Hx: Lives alone, ex smoker (quit  - smoked for 40 years); social alcohol; no drugs FH: No family history of cancer  Health Maintenance: Mammogram: 24: BIRADS 2 ([facility]) Colonoscopy:  with Dr. Hoskins - next due  DEXA: PAP: 101: Negative for intraepithelial lesion or malignancy. Negative HPV ([facility])

## 2025-05-09 NOTE — DISCUSSION/SUMMARY
[FreeTextEntry1] : Patient with recurrent vulvar dysplasia. last OR did multiple biopsies, all of which returned with lichenoid dermatitis or inflammation. patient has 2 white plaques and the very original biopsy periclitoral also was + for dysplasia. Will return to the OR for bilateral simple vulvectomy and re-evaluate area around clitoris, better exam in the OR possible resection of lesion is noted patient is in agreement, discussed effects on intercourse if clitoral resection. all questions answered plan for OR 6/6 med clearance PST

## 2025-06-03 NOTE — HISTORY OF PRESENT ILLNESS
[No Pertinent Cardiac History] : no history of aortic stenosis, atrial fibrillation, coronary artery disease, recent myocardial infarction, or implantable device/pacemaker [No Pertinent Pulmonary History] : no history of asthma, COPD, sleep apnea, or smoking [No Adverse Anesthesia Reaction] : no adverse anesthesia reaction in self or family member [(Patient denies any chest pain, claudication, dyspnea on exertion, orthopnea, palpitations or syncope)] : Patient denies any chest pain, claudication, dyspnea on exertion, orthopnea, palpitations or syncope [FreeTextEntry1] : vulvectomy [FreeTextEntry4] : 70yoF PMH RA, left breast CA s/p left mastectomy with muscle flap reconstruction s/p adjuvant chemotherapy (2017), vulvar dysplasia s/p vulvectomy, OA s/p right R THR, Schatzki ring presents for preop assessment  presents w vulvar recurrence   [de-identified] : 69yoF PMH RA, left breast CA s/p left mastectomy with muscle flap reconstruction s/p adjuvant chemotherapy (2017), OA s/p right R THR, Schatzki ring presents to establish care  follows closely with rheum, stable ophth exam q 6 mos - normal exam  follows with outside oncologist Dr Erwin, next appt 1/2024  endorses perioral papular rash wears mask constantly dermatology - perioral dermatitis  endorses subcutaneous nodule palmar aspect hand, nontender

## 2025-06-19 NOTE — REASON FOR VISIT
[Initial Evaluation] : an initial evaluation [FreeTextEntry1] : breast asymmetry following reconstruction surgery

## 2025-06-19 NOTE — ASSESSMENT
[FreeTextEntry1] : 69 yo s/p left mastectomy + axillary sentinel node biopsy (Dr. Thompson) and immediate reconstruction with right EMILEE flap 7/7/2017 for left breast cancer (Dr. Rushing); s/p revision of left reconstructed breast, symmetrizing right mastopexy 11/24/2017 (Dr. Rushing).  Today, patient presented with breast asymmetry between the left reconstructed and her native right breast, which she attributed to as a result of weight gain,  Based on the history and physical, I think the patient would be surgical candidate for right revision mastopexy. She would like to continue her care by Dr. Rushing.  In addition, she is interested in starting a weight loss regimen.

## 2025-06-19 NOTE — PHYSICAL EXAM
[Bra Size: _______] : Bra Size: [unfilled] [NI] : Normal [de-identified] : b/l surgical scars well healed b/l breast mounds are soft, no mass palpated right breast more ptotic and larger than left

## 2025-06-19 NOTE — PLAN
[TextEntry] : patient was given information on Dr. Rushing's office - she will continue her care with Dr. Rushing for evaluation/ surgical management of breast asymmetry  referral to [facility] at [place]  follow up prn  The entire office visit was conducted in the presence of a female chaperone/ NP.   I spent 60 minutes reviewing the patient's chart, labs, imaging, interviewing and examining patient, and discussing plan of care with the patient, PA team, and other providers, excluding separately billable procedures.

## 2025-06-19 NOTE — HISTORY OF PRESENT ILLNESS
[FreeTextEntry1] : The patient is a 70 year old female that presents with concerns about her breasts.  HPI: Ms. Turpin is s/p left mastectomy + axillary sentinel node biopsy (Dr. Thompson) and immediate reconstruction with right EMILEE flap 7/7/2017 for left breast cancer (Dr. Ruhsing); s/p revision of left reconstructed breast, symmetrizing right mastopexy 11/24/2017 (Dr. Rushing).  Her specific concerns include breast asymmetry that has progressively worsened the past few years. She believes the asymmetry has worsened since she gained weight (~ 20 lbs). She is experiencing difficulty fitting into her bra due to the asymmetry.   her most recent screening right breast mammogram ([facility]) on 1/13/2025 showed: Benign right mammography. The presence of heterogeneous or extremely dense breast tissue limits  assessment by mammography. BR-2  She is a former smoker (quitted 2007). She denies aspirin/ anticoagulant use. She is retired (used to work for [employer]).   She presents for consultation.

## 2025-06-23 NOTE — REASON FOR VISIT
[Post Op] : post op visit [de-identified] : 6/6/25 [de-identified] : b/l simple vulvectomy, left periclitoral biopsy [de-identified] :   Denies f/c/n/v/d/vaginal bleeding.  Overall feels well.  Meeting milestones of recovery.  Regular BM and voiding freely.  Final path: 1.  Vulva, right, vulvectomy: - Differentiated vulvar intraepithelial neoplasia (dVIN) in a background of lichenoid dermatitis. - Negative margins.  2.  Vulva, left, vulvectomy: - Differentiated vulvar intraepithelial neoplasia (dVIN) in a background of lichenoid dermatitis. - Negative margins.  3.  Vulva, periclitoral tissue, excision: - Differentiated vulvar intraepithelial neoplasia (dVIN) in a background of lichenoid dermatitis. - Negative margins.  Note: There are focal areas close to and at peripheral margins that show mild squamous atypia with inflammation, however, the atypia is not at the level of differentiated vulvar intraepithelial neoplasia (dVIN).

## 2025-07-11 NOTE — CONSULT LETTER
[Dear  ___] : Dear  [unfilled], [Consult Letter:] : I had the pleasure of evaluating your patient, [unfilled]. [Consult Closing:] : Thank you very much for allowing me to participate in the care of this patient.  If you have any questions, please do not hesitate to contact me. [Sincerely,] : Sincerely, [FreeTextEntry3] :  Kandis Ross NP

## 2025-07-11 NOTE — HISTORY OF PRESENT ILLNESS
[Improved Health] : Improved health [Quality of Life] : Quality of life [Improved Mobility] : Improved mobility [Middle Age (45-65)] : middle age (45-65) [Cut/Track Calories] : cut/track calories [Time management] : time management [Other: ____] : [unfilled] [Having a specific meal plan to follow] : having a specific meal plan to follow [6] : 6 [Lunch] : lunch [Dinner] : dinner [Late night] : late night [Salty] : salty [3-5] : Vegetables: 3-5 [1-2] : Meat, fish, poultry, eggs, cheese: 1-2 [8+] : How many cups of water do you regularly drink per day: 8+ [Self] : self [Skip Meals] : skip meals [Sometimes] : 5. Within the past 3 months, during your episodes of excessive overeating, how often were you embarrassed by how much you ate? Sometimes [Never or Rarely] : 7. Within the past 3 months, during your episodes of excessive overeating, how often did you make yourself vomit as a means to control your weight or shape? Never or Rarely [2+ miles] : Walking distance capability: 2+ miles [Walking] : walking [Swimming] : swimming [Other: ___] : [unfilled] [3] : 3 [45] : 45 [0] : 0 [None] : none [FreeTextEntry2] : 132 [FreeTextEntry3] : 195 [Less than 1] : Starches: less than 1 [] : No [HTN] : HTN [FreeTextEntry1] : CAMRON DUNHAM  is a 70 year female who present in the office with obesity (BMI 31.76 kg/m2) for bariatric surgery consultation and Medical Weight Loss Management. Patient was referred by Dr PARMAR.  The patient has tried multiple methods to lose weight in the past including diets, Calorie-counting, restricted intake, portion control and exercise without any long term success. The patient has been obese for many years. Patient reports that She unable to lose weight. Patient seek weight loss medications for improvement of their activity level, health and comorbid conditions. Diet recall was done. Nutritional counseling has been provided. The patient is encouraged to remain calorie conscious and continue a low fat, low carbohydrate, high protein diet. Also, emphasis has been placed on the importance of adequate hydration, multi-vitamin supplementation and exercise   PMHX includes: Obesity, Arthritis rheumatoid, Fatty liver, Goiter, HTN   PSHX includes: left mastectomy + axillary sentinel node biopsy (Dr. Thompson) and immediate reconstruction with right EMILEE flap 7/7/2017 for left breast cancer (Dr. Rushing); s/p revision of left reconstructed breast, symmetrizing right mastopexy 11/24/2017 (Dr. Rushing). Bilateral partial simple vulvectomy and periclitoral vulvar biopsy, Exploratory Laparotomy,  Hip Replacement.

## 2025-07-11 NOTE — ASSESSMENT
[FreeTextEntry1] : CAMRON DUNHAM  is a 70 year female with morbid obesity with BMI of 31.76 kg/m2 which places patient in the morbidly obese category. This has directly contributed to patient's current medical conditions as well as, a decreased quality of life.   Patient seek weight loss for improvement of their activity level, health and comorbid conditions.    I have had a lengthy conversation with the patient and have discussed my impression and treatment plan with the patient.   The patient had the opportunity to ask pertinent questions which were answered. All of patients questions and concerns were addressed to patients satisfaction, and patient verbalized an understanding of the information discussed   Nutrition was reviewed and reinforced, including the importance of making healthy food choices. The importance of maintaining regular exercise/physical activity also reinforced.   Ms. DUNHAM will schedule a visit with our program Registered Dietitian and attend support groups.   Patient was advised to use smaller portions.   Reinforced need for adequate hydration (at least 64 oz daily) and adequate protein intake (at least 60 - 70 g day), minimize carbs   Patient will benefit from GLP1 agonist.  Ms. DUNHAM  denies any family history of thyroid C-Cell tumors or MEN 2 syndrome.   We discussed multiple options for weight loss including dietary and lifestyle modification, medical therapy including GLP-1 agonist   Patient will be started on Metformin, advised to wait until we call with the results of the blood work   Patient will be started on Mounjaro 2.5 MG/0.5Mpending prior authorization   RTO in 4 to 6 weeks

## 2025-07-11 NOTE — ADDENDUM
[FreeTextEntry1] :  I spent 45 minutes reviewing the patient's chart, labs, imaging, interviewing and examining patient, and discussing plan of care with the patient, and other providers, excluding teaching and separately reported services and separately billable procedures.

## 2025-07-12 NOTE — PHYSICAL EXAM
[No Acute Distress] : no acute distress [Well Nourished] : well nourished [Normal] : no acute distress, well nourished, well developed and well-appearing [No Respiratory Distress] : no respiratory distress  [No Accessory Muscle Use] : no accessory muscle use [Clear to Auscultation] : lungs were clear to auscultation bilaterally [Normal Rate] : normal rate  [Regular Rhythm] : with a regular rhythm [Normal S1, S2] : normal S1 and S2 [No Edema] : there was no peripheral edema [Soft] : abdomen soft [Non Tender] : non-tender [de-identified] : no erythema/edema [de-identified] : mild erythema of right big toe medially, no sig tenderness on palpation

## 2025-07-12 NOTE — HEALTH RISK ASSESSMENT
[Excellent] : ~his/her~  mood as  excellent [2 - 3 times a week (3 pts)] : 2 - 3  times a week (3 points) [1 or 2 (0 pts)] : 1 or 2 (0 points) [Never (0 pts)] : Never (0 points) [No] : In the past 12 months have you used drugs other than those required for medical reasons? No [0] : 2) Feeling down, depressed, or hopeless: Not at all (0) [Yes] : takes [Former] : Former [20 or more] : 20 or more [> 15 Years] : > 15 Years [Alone] : lives alone [Retired] : retired [# Of Children ___] : has [unfilled] children [Fully functional (bathing, dressing, toileting, transferring, walking, feeding)] : Fully functional (bathing, dressing, toileting, transferring, walking, feeding) [No falls in past year] : Patient reported no falls in the past year [Little interest or pleasure doing things] : 1) Little interest or pleasure doing things [Feeling down, depressed, or hopeless] : 2) Feeling down, depressed, or hopeless [de-identified] : not every week [Audit-CScore] : 2 [[id number]] : 0 [de-identified] : 2.5 packs in a week for 40 ears [EyeExamDate] : 9/2025 [Change in mental status noted] : No change in mental status noted [Reports changes in hearing] : Reports no changes in hearing [Reports changes in vision] : Reports no changes in vision [MammogramDate] : 02/2025 [PapSmearDate] : 2024 [BoneDensityDate] : 6/2025 [ColonoscopyDate] : 10/2022 [FreeTextEntry2] :

## 2025-07-12 NOTE — REVIEW OF SYSTEMS
[Back Pain] : back pain [Negative] : Heme/Lymph [FreeTextEntry9] : right lower back pain and b/l feet pain
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
## 2025-07-12 NOTE — ASSESSMENT
[FreeTextEntry1] :  Back pain: likely MSK.  Pt declined referral to PT. Take ibuprofen or Tylenol prn.   Right big toe/b.l heel pain: possible Achilles tendonitis.  The pt may need to wear wider shoes with proper cushioning.  Referred to podiatry.  HM: reviewed labs done yesterday.  UTD with Mammo, pap smear, DEXA, colonoscopy.  Recommended shingles vaccines at the pharmacy.  RTC in 3 months for fu and flu/COVID vaccines.  [Vaccines Reviewed] : Immunizations reviewed today. Please see immunization details in the vaccine log within the immunization flowsheet.

## 2025-07-22 NOTE — ASSESSMENT
[FreeTextEntry1] : 1 hep c  obtain rna  levels  2  fatty liver  Fatty Liver: The patient denies any jaundice or pruritus. The patient denies any alcohol use. The patient denies taking large doses of nonsteroidal anti-inflammatory drugs or acetaminophen. The findings are suggestive of fatty liver. The patient and I had a long discussion regarding the risks of fatty liver progressing to cirrhosis. The patient was told of the possible increased risk of developing liver failure, cirrhosis, ascites, GI bleeding secondary to varices, hepatic encephalopathy, bleeding tendencies and liver cancer. The patient was told of the importance of follow-up. The patient was advised to follow up every 6 months for blood work and imaging studies. The patient agreed and will follow up. The patient was advised to lose weight. I recommend a trial of vitamin E supplementation for the fatty liver. If the liver enzymes remain elevated, the patient may require a trial of Pioglitazone for the fatty liver. I recommend avoid alcohol and hepato-toxic agents. The patient was also advised to avoid NSAIDs, Acetaminophen and any other hepatotoxic drugs. The patient was also advised not to share needles, razors, scissors, nail clippers, etc.. The patient is to continue close follow-up in our office for blood work and exams. If the liver enzymes remain elevated, the patient may require a CT guided liver biopsy to assess the liver parenchyma and for possible treatment. We had a long discussion regarding the risks and benefits of the procedure. The patient was told of the risks of bleeding, perforation, infections, emergency surgery and missing lesions. The patient agreed and will follow-up to reassess the symptoms Patient has been counseled on life style interventions, including but not limited to: weight loss (3-5% loss of body weight might improve fat in the liver, while 7-10% needed for potential improvement of other components, including inflammation and scarring of the liver, called fibrosis); healthy diet, avoiding added sugars, sodas, avoiding saturated fats, limiting sodium, avoiding alcohol; and on the importance of regular exercise (> 150 min/week moderate intensity aerobic exercise with at least 2x/week muscle strengthening or exercise as tolerated).  - I explained to patient the natural Hx of NAFLD.   3 schatzkis ring  Pt is doing well continue famotadine  no dysphagia   Gastroesophageal reflux disease (GERD) occurs when stomach acid frequently flows back into the tube connecting your mouth and stomach (esophagus). This backwash (acid reflux) can irritate the lining of your esophagus.  Many people experience acid reflux from time to time. GERD is mild acid reflux that occurs at least twice a week, or moderate to severe acid reflux that occurs at least once a week.   Most people can manage the discomfort of GERD with lifestyle changes and over-the-counter medications. But some people with GERD may need stronger medications or surgery to ease symptoms.  Symptoms  Common signs and symptoms of GERD include: A burning sensation in your chest (heartburn), usually after eating, which might be worse at night Chest pain Difficulty swallowing Regurgitation of food or sour liquid Sensation of a lump in your throat  If you have nighttime acid reflux, you might also experience: Chronic cough Laryngitis New or worsening asthma Disrupted sleep  When to see a doctor  Seek immediate medical care if you have chest pain, especially if you also have shortness of breath, or jaw or arm pain. These may be signs and symptoms of a heart attack.  Make an appointment with your doctor if you: Experience severe or frequent GERD symptoms Take over-the-counter medications for heartburn more than twice a week  Causes  GERD is caused by frequent acid reflux.  When you swallow, a circular band of muscle around the bottom of your esophagus (lower esophageal sphincter) relaxes to allow food and liquid to flow into your stomach. Then the sphincter closes again.  If the sphincter relaxes abnormally or weakens, stomach acid can flow back up into your esophagus. This constant backwash of acid irritates the lining of your esophagus, often causing it to become inflamed.  Risk factors  Conditions that can increase your risk of GERD include: Obesity Bulging of the top of the stomach up into the diaphragm (hiatal hernia) Pregnancy Connective tissue disorders, such as scleroderma Delayed stomach emptying  Factors that can aggravate acid reflux include: Smoking Eating large meals or eating late at night Eating certain foods (triggers) such as fatty or fried foods Drinking certain beverages, such as alcohol or coffee Taking certain medications, such as aspirin  Complications  Over time, chronic inflammation in your esophagus can cause: Narrowing of the esophagus (esophageal stricture). Damage to the lower esophagus from stomach acid causes scar tissue to form. The scar tissue narrows the food pathway, leading to problems with swallowing. An open sore in the esophagus (esophageal ulcer). Stomach acid can wear away tissue in the esophagus, causing an open sore to form. An esophageal ulcer can bleed, cause pain and make swallowing difficult. Precancerous changes to the esophagus (Vegas's esophagus). Damage from acid can cause changes in the tissue lining the lower esophagus. These changes are associated with an increased risk of esophageal cancer

## 2025-07-22 NOTE — PHYSICAL EXAM
[Alert] : alert [Normal Voice/Communication] : normal voice/communication [Healthy Appearing] : healthy appearing [No Acute Distress] : no acute distress [Sclera] : the sclera and conjunctiva were normal [Hearing Threshold Finger Rub Not [place]] : hearing was normal [Normal Lips/Gums] : the lips and gums were normal [Oropharynx] : the oropharynx was normal [Normal Appearance] : the appearance of the neck was normal [No Neck Mass] : no neck mass was observed [No Respiratory Distress] : no respiratory distress [No Acc Muscle Use] : no accessory muscle use [Respiration, Rhythm And Depth] : normal respiratory rhythm and effort [Auscultation Breath Sounds / Voice Sounds] : lungs were clear to auscultation bilaterally [Heart Rate And Rhythm] : heart rate was normal and rhythm regular [Normal S1, S2] : normal S1 and S2 [Murmurs] : no murmurs [None] : no edema [Bowel Sounds] : normal bowel sounds [Abdomen Tenderness] : non-tender [No Masses] : no abdominal mass palpated [Abdomen Soft] : soft [] : no hepatosplenomegaly [Normal] : normal [Soft, Nontender] : the abdomen was soft and nontender [No Mass] : no masses were palpated [No HSM] : no hepatosplenomegaly noted [Cervical Lymph Nodes Enlarged Posterior Bilaterally] : no posterior cervical lymphadenopathy [Cervical Lymph Nodes Enlarged Anterior Bilaterally] : no anterior cervical lymphadenopathy [No CVA Tenderness] : no CVA  tenderness [No Spinal Tenderness] : no spinal tenderness [Abnormal Walk] : normal gait [Normal Color / Pigmentation] : normal skin color and pigmentation [Motor Exam] : the motor exam was normal [Oriented To Time, Place, And Person] : oriented to person, place, and time [Over the Past 2 Weeks, Have You Felt Down, Depressed, or Hopeless?] : 1.) Over the past 2 weeks, have you felt down, depressed, or hopeless? No [Over the Past 2 Weeks, Have You Felt Little Interest or Pleasure Doing Things?] : 2.) Over the past 2 weeks, have you felt little interest or pleasure doing things? No